# Patient Record
Sex: MALE
[De-identification: names, ages, dates, MRNs, and addresses within clinical notes are randomized per-mention and may not be internally consistent; named-entity substitution may affect disease eponyms.]

---

## 2022-10-04 PROBLEM — Z00.00 ENCOUNTER FOR PREVENTIVE HEALTH EXAMINATION: Status: ACTIVE | Noted: 2022-10-04

## 2022-10-07 ENCOUNTER — APPOINTMENT (OUTPATIENT)
Dept: ORTHOPEDIC SURGERY | Facility: CLINIC | Age: 67
End: 2022-10-07

## 2022-10-07 VITALS
BODY MASS INDEX: 28.6 KG/M2 | HEIGHT: 75 IN | WEIGHT: 230 LBS | DIASTOLIC BLOOD PRESSURE: 92 MMHG | SYSTOLIC BLOOD PRESSURE: 159 MMHG | HEART RATE: 79 BPM

## 2022-10-07 DIAGNOSIS — Z72.89 OTHER PROBLEMS RELATED TO LIFESTYLE: ICD-10-CM

## 2022-10-07 DIAGNOSIS — Z78.9 OTHER SPECIFIED HEALTH STATUS: ICD-10-CM

## 2022-10-07 DIAGNOSIS — S83.241A OTHER TEAR OF MEDIAL MENISCUS, CURRENT INJURY, RIGHT KNEE, INITIAL ENCOUNTER: ICD-10-CM

## 2022-10-07 DIAGNOSIS — Z86.79 PERSONAL HISTORY OF OTHER DISEASES OF THE CIRCULATORY SYSTEM: ICD-10-CM

## 2022-10-07 DIAGNOSIS — Z84.81 FAMILY HISTORY OF CARRIER OF GENETIC DISEASE: ICD-10-CM

## 2022-10-07 DIAGNOSIS — M25.561 PAIN IN RIGHT KNEE: ICD-10-CM

## 2022-10-07 PROCEDURE — 99204 OFFICE O/P NEW MOD 45 MIN: CPT | Mod: 25

## 2022-10-07 PROCEDURE — 73564 X-RAY EXAM KNEE 4 OR MORE: CPT | Mod: RT

## 2022-10-07 PROCEDURE — 20610 DRAIN/INJ JOINT/BURSA W/O US: CPT | Mod: RT

## 2022-10-07 NOTE — HISTORY OF PRESENT ILLNESS
[de-identified] : Mr. GRIFFIN AGUILAR is a 67 year old male presenting for evaluation of one year history of right knee pain, worsening over the past 4 months. He reports twisting the knee about one year ago. Patient has an MRI in June which he brings fro review. Patient localizes his pain medially, and notes it is worse with all weightbearing activity including walking long distance, first rising from a seated position, as well as with deep flexion of the knee. He had a steroid injection in July of 2022 as well as a gel series in August of 2022 without relief. Patient takes OTC NSAIDs and Tylenol as needed. \par PMHx: cardiac stent 20 years ago, now on ASA 81.

## 2022-10-07 NOTE — PHYSICAL EXAM
[de-identified] : The patient appears well nourished  and in no apparent distress.  The patient is alert and oriented to person, place, and time.   Affect and mood appear normal. The head is normocephalic and atraumatic.  The eyes reveal normal sclera and extra ocular muscles are intact. The tongue is midline with no apparent lesions.  Skin shows normal turgor with no evidence of eczema or psoriasis.  No respiratory distress noted.  Sensation grossly intact.		  [de-identified] : Exam of the right knee shows -7 to 119 degrees of flexion measured with a goniometer. There is minimal effusion. Negative anterior drawer \par 5/5 motor strength bilaterally distally. Sensation intact distally.		  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 There is, however, full-thickness cartilage defect noted of the posterior., weightbearing lateral femoral condyle spanning up to 12 mm in AP extent, as above with minimal subchondral edema.\par 5.) There is a small effusion. \par 6.) Mild prepatellar subcutaneous edema.

## 2022-10-07 NOTE — ADDENDUM
[FreeTextEntry1] : This note was authored by Jean Keller working as a medical scribe for Dr. Richi Jones. The note was reviewed, edited, and revised by Dr. Richi Jones whom is in agreement with the exam findings, imaging findings, and treatment plan. 10/07/2022

## 2022-10-07 NOTE — CONSULT LETTER
[Dear  ___] : Dear  [unfilled], [Consult Letter:] : I had the pleasure of evaluating your patient, [unfilled]. [Please see my note below.] : Please see my note below. [Consult Closing:] : Thank you very much for allowing me to participate in the care of this patient.  If you have any questions, please do not hesitate to contact me. [Sincerely,] : Sincerely, [FreeTextEntry2] : JENNIE GIRON MD\par  [FreeTextEntry3] : Richi Jones MD\par Chief of Joint Replacement\par [facility] \par [facility]\par \par

## 2022-10-07 NOTE — DISCUSSION/SUMMARY
[de-identified] : GRIFFIN AGUILAR is a 67 year male who presents with right knee moderate medial compartment arthritis.  Given the extent of his arthritis I do not think arthroscopic surgery will reliably reduce his pain.  On the other hand, while the patient may eventually benefit from right total knee replacement, he has not yet exhausted nonoperative treatment options. As such, nonoperative treatment options were discussed including antiinflammatories, physical therapy, intraarticular cortisone injections, and hyaluronic acid gel injections. The patient received an intraarticular cortisone injection in the right knee in the office today. The patient will follow up 6 weeks post injection.  If he continues not to respond to nonoperative treatment we may ultimately decide to proceed with knee replacement in the spring.  He spends the winter down in [place].\par \par

## 2022-10-07 NOTE — PROCEDURE
[de-identified] : Using sterile technique, 2cc of depomedrol 40mg/ml, 4cc of 1% plain lidocaine, and 2 cc 0.25% marcaine was drawn up into a sterile syringe. The right knee was then sterilely prepped with chlorhexidine. Ethyl chloride spray was used to anesthetize the skin and subQ tissue.  The depomedrol/lidocaine/marcaine mixture was then injected into the knee joint in the anterolateral position. The patient tolerated the procedure well without difficulty. The patient was given instructions on the use of ice and anti-inflammatories post injection site soreness.		\par

## 2022-10-13 ENCOUNTER — APPOINTMENT (OUTPATIENT)
Dept: ORTHOPEDIC SURGERY | Facility: CLINIC | Age: 67
End: 2022-10-13

## 2022-10-13 VITALS
HEART RATE: 71 BPM | DIASTOLIC BLOOD PRESSURE: 93 MMHG | BODY MASS INDEX: 28.6 KG/M2 | WEIGHT: 230 LBS | SYSTOLIC BLOOD PRESSURE: 150 MMHG | HEIGHT: 75 IN

## 2022-10-13 DIAGNOSIS — M17.11 UNILATERAL PRIMARY OSTEOARTHRITIS, RIGHT KNEE: ICD-10-CM

## 2022-10-13 PROCEDURE — 99214 OFFICE O/P EST MOD 30 MIN: CPT

## 2022-10-13 RX ORDER — CEPHALEXIN 500 MG/1
500 TABLET ORAL
Qty: 28 | Refills: 0 | Status: ACTIVE | COMMUNITY
Start: 2022-10-13 | End: 1900-01-01

## 2022-10-13 NOTE — PHYSICAL EXAM
[de-identified] : The patient appears well nourished  and in no apparent distress.  The patient is alert and oriented to person, place, and time.   Affect and mood appear normal. The head is normocephalic and atraumatic.  The eyes reveal normal sclera and extra ocular muscles are intact. The tongue is midline with no apparent lesions.  No respiratory distress noted.  Sensation grossly intact.		  [de-identified] : Exam of the right knee shows no effusion.  No pain with knee range of motion.  Over the anterior lower leg there is a discrete area which appears like a rash that is mildly tender and pruritic.  No erythema tracking down to the lower leg or ankle.  There is also an area of mild erythema diffusely over the medial proximal thigh which is also mildly tender.

## 2022-10-13 NOTE — HISTORY OF PRESENT ILLNESS
[de-identified] : Mr. GRIFFIN AGUILAR is a 67 year old male presenting for evaluation of right knee OA. Patient was seen in office on 10/7/22 and received a steroid injection to the right knee. His knee is doing much better, however over the past few days he developed erythema to the mid shin as well as inner aspect of the proximal thigh. He has one incidence of chills the morning of 10/10/22. No fever. He states the area of the shin is warm and painful to the touch and has also been pruritic. no erythema or warmth of the knee. \par PMHx: cardiac stent 20 years ago, now on ASA 81.

## 2022-10-13 NOTE — DISCUSSION/SUMMARY
[de-identified] : GRIFFIN AGUILAR is a 67 year male who presents with a concern for right leg cellulitis.  The medial thigh does appear to be cellulitic however the anterior lower leg appears more reactive and appears more like a rash.  Going to give him an oral antibiotic prophylactically and he was advised to follow-up for further management of this with his primary care doctor.  His knee itself is feeling much better from the cortisone injection and we can consider another injection as needed in 3 months.

## 2022-10-13 NOTE — ADDENDUM
[FreeTextEntry1] : This note was authored by Jean Keller working as a medical scribe for Dr. Richi Jones. The note was reviewed, edited, and revised by Dr. Richi Jones whom is in agreement with the exam findings, imaging findings, and treatment plan. 10/13/2022

## 2023-09-01 ENCOUNTER — APPOINTMENT (OUTPATIENT)
Dept: ORTHOPEDIC SURGERY | Facility: CLINIC | Age: 68
End: 2023-09-01
Payer: MEDICARE

## 2023-09-01 VITALS
WEIGHT: 230 LBS | HEIGHT: 75 IN | HEART RATE: 84 BPM | SYSTOLIC BLOOD PRESSURE: 161 MMHG | DIASTOLIC BLOOD PRESSURE: 93 MMHG | BODY MASS INDEX: 28.6 KG/M2

## 2023-09-01 PROCEDURE — 20610 DRAIN/INJ JOINT/BURSA W/O US: CPT | Mod: RT

## 2023-09-01 PROCEDURE — 73564 X-RAY EXAM KNEE 4 OR MORE: CPT | Mod: RT

## 2023-09-01 PROCEDURE — 99214 OFFICE O/P EST MOD 30 MIN: CPT | Mod: 25

## 2023-09-01 NOTE — PHYSICAL EXAM
[de-identified] : The patient appears well nourished and in no apparent distress.  The patient is alert and oriented to person, place, and time.   Affect and mood appear normal. The head is normocephalic and atraumatic.  The eyes reveal normal sclera and extra ocular muscles are intact. The tongue is midline with no apparent lesions.  Skin shows normal turgor with no evidence of eczema or psoriasis.  No respiratory distress noted.  Sensation grossly intact.		  [de-identified] : Exam of the right knee shows -5 to 125 degrees of flexion measured with a goniometer. There is no effusion.  5/5 motor strength bilaterally distally. Sensation intact distally.		  [de-identified] : X-ray: 4 views of the right knee demonstrate	moderate varus arthritis.

## 2023-09-01 NOTE — HISTORY OF PRESENT ILLNESS
[de-identified] : GRIFFIN AGUILAR is a 68 year old male who presents with right knee pain. He has previously been diagnosed with osteoarthritis in the knee. His pain is worse medially and with weightbearing activity. In October 2022, he received a cortisone injection in the knee that provided adequate relief that lasted a year. His pain has since returned and he presents today hopeful for another cortisone injection.

## 2023-09-01 NOTE — DISCUSSION/SUMMARY
[de-identified] : GRIFFIN AGUILAR is a 68 year old male who presents with right knee moderate varus arthritis. As requested and given his past success with them, the patient received an intraarticular cortisone injection in the right knee in the office today. The patient will follow up 6 weeks post injection.

## 2023-09-01 NOTE — PROCEDURE
[de-identified] : Using sterile technique, 2cc of depomedrol 40mg/ml, 4cc of 1% plain lidocaine, and 2 cc 0.25% marcaine was drawn up into a sterile syringe. The right knee was then sterilely prepped with chlorhexidine. Ethyl chloride spray was used to anesthetize the skin and subQ tissue.  The depomedrol/lidocaine/marcaine mixture was then injected into the knee joint in the anterolateral position. The patient tolerated the procedure well without difficulty. The patient was given instructions on the use of ice and anti-inflammatories post injection site soreness.

## 2023-09-01 NOTE — ADDENDUM
[FreeTextEntry1] : This note was authored by Jean Keller working as a medical scribe for Dr. Richi Jones. The note was reviewed, edited, and revised by Dr. Richi Jones whom is in agreement with the exam findings, imaging findings, and treatment plan. 09/01/2023

## 2024-10-14 ENCOUNTER — APPOINTMENT (OUTPATIENT)
Dept: ORTHOPEDIC SURGERY | Facility: CLINIC | Age: 69
End: 2024-10-14
Payer: MEDICARE

## 2024-10-14 VITALS — HEIGHT: 75 IN | WEIGHT: 228 LBS | BODY MASS INDEX: 28.35 KG/M2

## 2024-10-14 DIAGNOSIS — M17.11 UNILATERAL PRIMARY OSTEOARTHRITIS, RIGHT KNEE: ICD-10-CM

## 2024-10-14 PROCEDURE — 73564 X-RAY EXAM KNEE 4 OR MORE: CPT | Mod: RT

## 2024-10-14 PROCEDURE — 99204 OFFICE O/P NEW MOD 45 MIN: CPT | Mod: 25

## 2024-10-14 PROCEDURE — 20610 DRAIN/INJ JOINT/BURSA W/O US: CPT | Mod: RT

## 2024-10-14 PROCEDURE — J3490M: CUSTOM | Mod: NC,JZ

## 2024-10-18 RX ORDER — MELOXICAM 15 MG/1
15 TABLET ORAL
Qty: 30 | Refills: 0 | Status: ACTIVE | COMMUNITY
Start: 2024-10-18 | End: 1900-01-01

## 2024-11-15 NOTE — REVIEW OF SYSTEMS
Please use lemon glycerin swab sticks for dry oral mucus membranes three times a day as needed.   [Joint Pain] : joint pain